# Patient Record
Sex: FEMALE | Race: WHITE | NOT HISPANIC OR LATINO | Employment: PART TIME | URBAN - METROPOLITAN AREA
[De-identification: names, ages, dates, MRNs, and addresses within clinical notes are randomized per-mention and may not be internally consistent; named-entity substitution may affect disease eponyms.]

---

## 2017-01-16 ENCOUNTER — PRENATAL OFFICE VISIT - HEALTHEAST (OUTPATIENT)
Dept: FAMILY MEDICINE | Facility: CLINIC | Age: 37
End: 2017-01-16

## 2017-01-16 DIAGNOSIS — Z34.92 PRENATAL CARE IN SECOND TRIMESTER: ICD-10-CM

## 2017-01-16 DIAGNOSIS — R79.89 ABNORMAL TSH: ICD-10-CM

## 2017-01-16 ASSESSMENT — MIFFLIN-ST. JEOR: SCORE: 1403.44

## 2017-01-17 ENCOUNTER — COMMUNICATION - HEALTHEAST (OUTPATIENT)
Dept: FAMILY MEDICINE | Facility: CLINIC | Age: 37
End: 2017-01-17

## 2017-02-06 ENCOUNTER — COMMUNICATION - HEALTHEAST (OUTPATIENT)
Dept: FAMILY MEDICINE | Facility: CLINIC | Age: 37
End: 2017-02-06

## 2017-02-06 DIAGNOSIS — Z34.90 PREGNANCY: ICD-10-CM

## 2017-02-13 ENCOUNTER — PRENATAL OFFICE VISIT - HEALTHEAST (OUTPATIENT)
Dept: FAMILY MEDICINE | Facility: CLINIC | Age: 37
End: 2017-02-13

## 2017-02-13 ENCOUNTER — COMMUNICATION - HEALTHEAST (OUTPATIENT)
Dept: FAMILY MEDICINE | Facility: CLINIC | Age: 37
End: 2017-02-13

## 2017-02-13 ENCOUNTER — AMBULATORY - HEALTHEAST (OUTPATIENT)
Dept: LAB | Facility: CLINIC | Age: 37
End: 2017-02-13

## 2017-02-13 DIAGNOSIS — Z34.90 PREGNANCY: ICD-10-CM

## 2017-02-13 ASSESSMENT — MIFFLIN-ST. JEOR: SCORE: 1428.39

## 2017-02-21 ENCOUNTER — COMMUNICATION - HEALTHEAST (OUTPATIENT)
Dept: FAMILY MEDICINE | Facility: CLINIC | Age: 37
End: 2017-02-21

## 2017-02-27 ENCOUNTER — PRENATAL OFFICE VISIT - HEALTHEAST (OUTPATIENT)
Dept: FAMILY MEDICINE | Facility: CLINIC | Age: 37
End: 2017-02-27

## 2017-02-27 DIAGNOSIS — Z34.90 PREGNANCY: ICD-10-CM

## 2017-02-27 ASSESSMENT — MIFFLIN-ST. JEOR: SCORE: 1434.06

## 2017-03-13 ENCOUNTER — PRENATAL OFFICE VISIT - HEALTHEAST (OUTPATIENT)
Dept: FAMILY MEDICINE | Facility: CLINIC | Age: 37
End: 2017-03-13

## 2017-03-13 DIAGNOSIS — Z34.90 PREGNANCY: ICD-10-CM

## 2017-03-20 ENCOUNTER — PRENATAL OFFICE VISIT - HEALTHEAST (OUTPATIENT)
Dept: FAMILY MEDICINE | Facility: CLINIC | Age: 37
End: 2017-03-20

## 2017-03-20 DIAGNOSIS — Z34.90 PREGNANCY: ICD-10-CM

## 2017-03-20 ASSESSMENT — MIFFLIN-ST. JEOR: SCORE: 1439.73

## 2017-03-28 ENCOUNTER — PRENATAL OFFICE VISIT - HEALTHEAST (OUTPATIENT)
Dept: FAMILY MEDICINE | Facility: CLINIC | Age: 37
End: 2017-03-28

## 2017-03-28 DIAGNOSIS — Z34.90 PREGNANCY: ICD-10-CM

## 2017-03-28 ASSESSMENT — MIFFLIN-ST. JEOR: SCORE: 1437.46

## 2017-04-07 ENCOUNTER — PRENATAL OFFICE VISIT - HEALTHEAST (OUTPATIENT)
Dept: FAMILY MEDICINE | Facility: CLINIC | Age: 37
End: 2017-04-07

## 2017-04-07 DIAGNOSIS — Z34.90 PREGNANCY: ICD-10-CM

## 2017-04-07 ASSESSMENT — MIFFLIN-ST. JEOR: SCORE: 1445.4

## 2017-04-11 ENCOUNTER — PRENATAL OFFICE VISIT - HEALTHEAST (OUTPATIENT)
Dept: FAMILY MEDICINE | Facility: CLINIC | Age: 37
End: 2017-04-11

## 2017-04-11 DIAGNOSIS — Z34.90 PREGNANCY: ICD-10-CM

## 2017-04-11 ASSESSMENT — MIFFLIN-ST. JEOR: SCORE: 1439.73

## 2017-04-18 ENCOUNTER — PRENATAL OFFICE VISIT - HEALTHEAST (OUTPATIENT)
Dept: FAMILY MEDICINE | Facility: CLINIC | Age: 37
End: 2017-04-18

## 2017-04-18 DIAGNOSIS — Z34.90 PREGNANCY: ICD-10-CM

## 2017-04-18 ASSESSMENT — MIFFLIN-ST. JEOR: SCORE: 1448.8

## 2017-04-25 ENCOUNTER — AMBULATORY - HEALTHEAST (OUTPATIENT)
Dept: FAMILY MEDICINE | Facility: CLINIC | Age: 37
End: 2017-04-25

## 2017-04-25 ENCOUNTER — PRENATAL OFFICE VISIT - HEALTHEAST (OUTPATIENT)
Dept: FAMILY MEDICINE | Facility: CLINIC | Age: 37
End: 2017-04-25

## 2017-04-25 DIAGNOSIS — O48.0 POST-TERM PREGNANCY, 40-42 WEEKS OF GESTATION: ICD-10-CM

## 2017-04-25 DIAGNOSIS — O48.0 POST-DATES PREGNANCY: ICD-10-CM

## 2017-04-25 ASSESSMENT — MIFFLIN-ST. JEOR: SCORE: 1444.26

## 2017-04-26 ENCOUNTER — HOSPITAL ENCOUNTER (OUTPATIENT)
Dept: OBGYN | Facility: HOSPITAL | Age: 37
Discharge: HOME | End: 2017-04-26
Attending: FAMILY MEDICINE | Admitting: FAMILY MEDICINE

## 2017-04-26 ENCOUNTER — HOSPITAL ENCOUNTER (OUTPATIENT)
Dept: ULTRASOUND IMAGING | Facility: HOSPITAL | Age: 37
Discharge: HOME | End: 2017-04-26
Attending: FAMILY MEDICINE

## 2017-04-26 DIAGNOSIS — O48.0 POST-DATES PREGNANCY: ICD-10-CM

## 2017-04-26 ASSESSMENT — MIFFLIN-ST. JEOR: SCORE: 1444.26

## 2017-04-27 ENCOUNTER — SURGERY - HEALTHEAST (OUTPATIENT)
Dept: OBGYN | Facility: HOSPITAL | Age: 37
End: 2017-04-27

## 2017-04-27 ENCOUNTER — ANESTHESIA - HEALTHEAST (OUTPATIENT)
Dept: OBGYN | Facility: HOSPITAL | Age: 37
End: 2017-04-27

## 2017-04-27 ENCOUNTER — COMMUNICATION - HEALTHEAST (OUTPATIENT)
Dept: OBGYN | Facility: HOSPITAL | Age: 37
End: 2017-04-27

## 2017-04-27 ASSESSMENT — MIFFLIN-ST. JEOR: SCORE: 1444.26

## 2017-05-25 ENCOUNTER — COMMUNICATION - HEALTHEAST (OUTPATIENT)
Dept: FAMILY MEDICINE | Facility: CLINIC | Age: 37
End: 2017-05-25

## 2017-06-27 ENCOUNTER — OFFICE VISIT - HEALTHEAST (OUTPATIENT)
Dept: FAMILY MEDICINE | Facility: CLINIC | Age: 37
End: 2017-06-27

## 2017-06-27 ASSESSMENT — MIFFLIN-ST. JEOR: SCORE: 1384.16

## 2017-07-21 ENCOUNTER — COMMUNICATION - HEALTHEAST (OUTPATIENT)
Dept: HEALTH INFORMATION MANAGEMENT | Facility: CLINIC | Age: 37
End: 2017-07-21

## 2017-10-30 ENCOUNTER — AMBULATORY - HEALTHEAST (OUTPATIENT)
Dept: NURSING | Facility: CLINIC | Age: 37
End: 2017-10-30

## 2017-10-30 DIAGNOSIS — Z23 NEED FOR IMMUNIZATION AGAINST INFLUENZA: ICD-10-CM

## 2018-02-02 ENCOUNTER — COMMUNICATION - HEALTHEAST (OUTPATIENT)
Dept: FAMILY MEDICINE | Facility: CLINIC | Age: 38
End: 2018-02-02

## 2018-06-30 ENCOUNTER — COMMUNICATION - HEALTHEAST (OUTPATIENT)
Dept: FAMILY MEDICINE | Facility: CLINIC | Age: 38
End: 2018-06-30

## 2018-12-14 ENCOUNTER — OFFICE VISIT - HEALTHEAST (OUTPATIENT)
Dept: FAMILY MEDICINE | Facility: CLINIC | Age: 38
End: 2018-12-14

## 2018-12-14 ENCOUNTER — COMMUNICATION - HEALTHEAST (OUTPATIENT)
Dept: FAMILY MEDICINE | Facility: CLINIC | Age: 38
End: 2018-12-14

## 2018-12-14 DIAGNOSIS — Z00.00 HEALTHY ADULT ON ROUTINE PHYSICAL EXAMINATION: ICD-10-CM

## 2018-12-14 DIAGNOSIS — Z83.3 FAMILY HISTORY OF DIABETES MELLITUS: ICD-10-CM

## 2018-12-14 LAB
FASTING STATUS PATIENT QL REPORTED: YES
GLUCOSE BLD-MCNC: 79 MG/DL (ref 70–99)

## 2018-12-14 ASSESSMENT — MIFFLIN-ST. JEOR: SCORE: 1389.83

## 2019-09-25 ENCOUNTER — COMMUNICATION - HEALTHEAST (OUTPATIENT)
Dept: FAMILY MEDICINE | Facility: CLINIC | Age: 39
End: 2019-09-25

## 2019-09-25 DIAGNOSIS — J98.01 ACUTE BRONCHOSPASM: ICD-10-CM

## 2019-09-25 RX ORDER — ALBUTEROL SULFATE 90 UG/1
2 AEROSOL, METERED RESPIRATORY (INHALATION) EVERY 4 HOURS PRN
Qty: 1 INHALER | Refills: 1 | Status: SHIPPED | OUTPATIENT
Start: 2019-09-25

## 2021-05-30 VITALS — HEIGHT: 60 IN | BODY MASS INDEX: 36.71 KG/M2 | WEIGHT: 187 LBS

## 2021-05-30 VITALS — WEIGHT: 186.5 LBS | HEIGHT: 60 IN | BODY MASS INDEX: 36.61 KG/M2

## 2021-05-30 VITALS — HEIGHT: 60 IN | WEIGHT: 187 LBS | BODY MASS INDEX: 36.71 KG/M2

## 2021-05-30 VITALS — WEIGHT: 184.5 LBS | HEIGHT: 60 IN | BODY MASS INDEX: 36.22 KG/M2

## 2021-05-30 VITALS — HEIGHT: 60 IN | BODY MASS INDEX: 36.96 KG/M2 | WEIGHT: 188.25 LBS

## 2021-05-30 VITALS — HEIGHT: 60 IN | WEIGHT: 188 LBS | BODY MASS INDEX: 36.91 KG/M2

## 2021-05-30 VITALS — BODY MASS INDEX: 36.91 KG/M2 | WEIGHT: 188 LBS | HEIGHT: 60 IN

## 2021-05-30 VITALS — HEIGHT: 60 IN | BODY MASS INDEX: 36.91 KG/M2 | WEIGHT: 188 LBS

## 2021-05-30 VITALS — WEIGHT: 189 LBS | BODY MASS INDEX: 37.11 KG/M2 | HEIGHT: 60 IN

## 2021-05-30 VITALS — HEIGHT: 60 IN | WEIGHT: 185.75 LBS | BODY MASS INDEX: 36.47 KG/M2

## 2021-05-30 VITALS — BODY MASS INDEX: 37.01 KG/M2 | WEIGHT: 189.5 LBS

## 2021-05-30 VITALS — WEIGHT: 179 LBS | BODY MASS INDEX: 35.14 KG/M2 | HEIGHT: 60 IN

## 2021-05-31 VITALS — HEIGHT: 60 IN | BODY MASS INDEX: 34.31 KG/M2 | WEIGHT: 174.75 LBS

## 2021-06-01 NOTE — TELEPHONE ENCOUNTER
Medication Request  Medication name:  Ventolin   Pharmacy Name and Location:   [facility] - [employer] , [facility]  Reason for request: Patient has cold , its triggering her asthma, requesting refill today. Please send to pharmacy & notify patient.  When did you use medication last?: 5 months ago   Okay to leave a detailed message: yes

## 2021-06-01 NOTE — TELEPHONE ENCOUNTER
CMT reviewed chart. No recent visits. No upcoming appointments. No record of albuterol having been sent-listed as historical. CMT called to collect more information.     The patient caught a cold from her son, this is triggering a dry, asthmatic cough that she gets when she is in cold air. The patient has been kept up at night from this.     Patient had been getting her asthma medications from a provider outside of [facility] years ago. Patient notified that she does not have an updated AAP, patient states that she typically doesn't need asthma medications.

## 2021-06-02 ENCOUNTER — RECORDS - HEALTHEAST (OUTPATIENT)
Dept: ADMINISTRATIVE | Facility: CLINIC | Age: 41
End: 2021-06-02

## 2021-06-02 VITALS — BODY MASS INDEX: 34.55 KG/M2 | HEIGHT: 60 IN | WEIGHT: 176 LBS

## 2021-06-08 NOTE — PROGRESS NOTES
Doing well.  No problems.    Doing her 1 hour gct today.    tdap one today.  Declines syphilis test.

## 2021-06-08 NOTE — PROGRESS NOTES
Doing well.  No problems  Baby is moving well.    She is down to nursing once daily.    1 hour gct, hgb, uai.   tdap at next visit.

## 2021-06-09 NOTE — PROGRESS NOTES
She is doing ok.    She is walking a bit.    Encouraged healthy eating.    Vertex on this US.    US at bedside at next visit.

## 2021-06-09 NOTE — PROGRESS NOTES
Doing well.  No problems.    Breech position today.  Mom feeling movement in a different location this visit.    [facility].    US at bedside at next visit.    gbs at next visit.

## 2021-06-09 NOTE — PROGRESS NOTES
Doing well.  Felt baby turn again.  No lof.  No bleeding.  Keep appt with [facility] in case baby flips again.    Needs US at hospital.

## 2021-06-09 NOTE — PROGRESS NOTES
Doing well.  [facility] went well.    Baby is measuring well.  chris was normal per the patient.    Baby is vertex still.   Will check when she gets to the hospital.  Can attempt version at that time if baby turns around again.    No bleeding ,cramping, or lof.    gbs negative.

## 2021-06-10 NOTE — ANESTHESIA PREPROCEDURE EVALUATION
Anesthesia Evaluation      Patient summary reviewed   No history of anesthetic complications     Airway   Mallampati: II  Neck ROM: full   Pulmonary - normal exam    breath sounds clear to auscultation  (+) asthma                           Cardiovascular - negative ROS and normal exam  Rhythm: regular  Rate: normal,         Neuro/Psych - negative ROS     Endo/Other - negative ROS   (+) obesity,   (-) not pregnant (immediately post partum)     GI/Hepatic/Renal    (-) GERD (denies)     Other findings: Jenkins      Dental - normal exam                        Anesthesia Plan  Planned anesthetic: MAC  Pt with nothing to eat since last evening at dinner    Pt has been and is HD stable.  ASA 2 - emergent     Anesthetic plan and risks discussed with: patient and spouse  Anesthesia plan special considerations: antiemetics,   Post-op plan: routine recovery

## 2021-06-10 NOTE — PROGRESS NOTES
discused induction.    Will plan for cytotec inuction on monday  bpp with nst this weekend  Membranes stripped today.    nst done today.

## 2021-06-10 NOTE — PROGRESS NOTES
Doing well.  No problems.  It feels like the baby dropped down a little bit.    It feels like it is still in a good position.    No swelling.   nst at next visit  Strip membranes at next visit

## 2021-06-10 NOTE — PROGRESS NOTES
Doing well.  No problems.  It does not feel like the lorna troos moved position.    No bleeding, cramping, lof.  No contractions.  She is still walking.  She will be done with work tomorrow.    She is not sure if she is going to go back to work.  She might look for more jobs.    Discussed cytotec for history of retained placenta.

## 2021-06-10 NOTE — ANESTHESIA POSTPROCEDURE EVALUATION
Patient: Raven Aguilar  REMOVAL, RETAINED PLACENTA  Anesthesia type: MAC    Patient location: Labor and Delivery  Last vitals:   Vitals:    04/27/17 1600   BP: 99/54   Pulse: 65   Resp: 16   Temp: 36.8  C (98.2  F)   SpO2:      Post vital signs: stable  Level of consciousness: awake and responds to simple questions  Post-anesthesia pain: pain controlled  Post-anesthesia nausea and vomiting: no  Pulmonary: unassisted, return to baseline  Cardiovascular: stable and blood pressure at baseline  Hydration: adequate  Anesthetic events: no    QCDR Measures:  ASA# 11 - Devorah-op Cardiac Arrest: ASA11B - Patient did NOT experience unanticipated cardiac arrest  ASA# 12 - Devorah-op Mortality Rate: ASA12B - Patient did NOT die  ASA# 13 - PACU Re-Intubation Rate: NA - No ETT / LMA used for case  ASA# 10 - Composite Anes Safety: ASA10A - No serious adverse event  ASA# 38 - New Corneal Injury: ASA38A - No new exposure keratitis or corneal abrasion in PACU    Additional Notes:

## 2021-06-10 NOTE — ANESTHESIA CARE TRANSFER NOTE
Last vitals:   Vitals:    04/27/17 0723   BP: 107/52   Pulse: 81   Resp: 14   Temp: 36.7  C (98.1  F)   SpO2: 100%     Patient's level of consciousness is drowsy  Spontaneous respirations: yes  Maintains airway independently: yes  Dentition unchanged: yes  Oropharynx: oropharynx clear of all foreign objects    QCDR Measures:  ASA# 20 - Surgical Safety Checklist: ASA20A - Safety Checks Done  PQRS# 430 - Adult PONV Prevention: 4558F - Pt received => 2 anti-emetic agents (different classes) preop & intraop  ASA# 8 - Peds PONV Prevention: NA - Not pediatric patient, not GA or 2 or more risk factors NOT present  PQRS# 424 - Devorah-op Temp Management: 4559F - At least one body temp DOCUMENTED => 35.5C or 95.9F within required timeframe  PQRS# 426 - PACU Transfer Protocol: - Transfer of care checklist used  ASA# 14 - Acute Post-op Pain: ASA14B - Patient did NOT experience pain >= 7 out of 10    I completed my SBAR handoff to the receiving nurse per policy and procedure.

## 2021-06-11 NOTE — PROGRESS NOTES
S:  36 yo female who is here for a postpartum visit.  She is going to use condoms for birth control.  She has stopped bleeding.  No sadness or depression.  She has normal bowel and bladder function.  She is breastfeeding.   They are thinking they might want another baby.    Her vaginal delivery was complicated by a retained placenta that did not respond to 800mcg of cytotec given immediately after delivery.  She did up with a manual extraction in the OR.  This is the second time that she has had a retained placenta that required removal of the OR.    She is not going to be going back to work for a little while.  They plan to move into [facility] if possible.  They plan to go look at a house tomorrow.    They have outgrown their home.          O:  /70 (Patient Site: Left Arm, Patient Position: Sitting, Cuff Size: Adult Regular)  Pulse 84  Temp 98.1  F (36.7  C) (Oral)   Resp 16  Ht 5' (1.524 m)  Wt 174 lb 12 oz (79.3 kg)  Breastfeeding? Yes  BMI 34.13 kg/m2  Gen: no acute distress  Neck:  Supple, no lad, no thyromegaly or nodules.    Heart:  Regular rate and rhythm.  No m/r/g  Breast exam is normal  Lungs: cta bilaterally, no wheezes or rhonchi.  Good air inspiration  Abdomen:  No masses or organomegaly  Extremities:  No edema.     Skin:  No rashes  Psych:  Normal.  No depression noted.        Patient Active Problem List   Diagnosis     Lactating mother     Nausea     Vaginal delivery     Retained complete placenta     Current Outpatient Prescriptions on File Prior to Visit   Medication Sig Dispense Refill     cholecalciferol, vitamin D3, (VITAMIN D3) 1,000 unit capsule Take 1,000 Units by mouth daily.       PRENATAL VITS W-CA,FE,FA,<1MG, (PRENATAL VITAMIN ORAL) Take 1 tablet by mouth daily.       albuterol (VENTOLIN HFA) 90 mcg/actuation inhaler Inhale 2 puffs every 4 (four) hours as needed for wheezing.       No current facility-administered medications on file prior to visit.            No results found for this or any previous visit (from the past 48 hour(s)).      Assessment/Plan:  1. Postpartum care and examination  Encouraged regular exercise and healthy diet  Encouraged 10 lb weight loss prior to planning out any further pregnancies  Encouraged to keep breastfeeding.  Encouraged to let me know if she has any problems.  She is utd on all health maintenance.  She should stay on a pnv with iron for the next 6 months given that her last hgb was in the 10's prior to manual extraction of her placenta in the OR.            Araceli Manjarrez   6/27/2017 4:43 PM

## 2021-06-22 NOTE — PROGRESS NOTES
FEMALE PREVENTATIVE EXAM    Assessment and Plan:       1. Family history of diabetes mellitus  Advised diet changes in the entire house.    Advised increased activity as she is able.    - Glucose     2.  Overweight:  Advised diet changes and increased cardiac activity.    Next follow up:  No Follow-up on file.    Immunization Review  Adult Imm Review: She would like to wait and get her flu vaccine with her son        I discussed the following with the patient:   Adult Healthy Living: Importance of regular exercise  Healthy nutrition  Getting adequate sleep  Stress management    I have had an Advance Directives discussion with the patient.  She was given an honoring choices form to fill out today.  She will take this home and discuss it with not only her parents but also her .    Subjective:   Chief Complaint: Raven Aguilar is an 38 y.o. female here for a preventative health visit.     HPI:  37 yo female who is here for a complete physical.  She is doing well.  She is quite busy with her kids.    She is using condoms for birth control.  This is working well for her . Her menses are not heavy.      They are currently doing construction in her home, in her basement.  Their house was built in the 60's.    She walks jennifer to school daily, twice daily.  This is a 5 block walk each way.    She is still nursing.      Healthy Habits  Are you taking a daily aspirin? No  Do you typically exercising at least 40 min, 3-4 times per week?  NO  Do you usually eat at least 4 servings of fruit and vegetables a day, include whole grains and fiber and avoid regularly eating high fat foods? Yes  Have you had an eye exam in the past two years? Yes  Do you see a dentist twice per year? Yes  Do you have any concerns regarding sleep? No    Safety Screen  If you own firearms, are they secured in a locked gun cabinet or with trigger locks? The patient does not own any firearms  Do you feel you are safe where you are living?: Yes  (12/14/2018 10:55 AM)  Do you feel you are safe in your relationship(s)?: Yes (12/14/2018 10:55 AM)      Review of Systems:  Please see above.  The rest of the review of systems are negative for all systems.       Cancer Screening       Status Date      PAP SMEAR Next Due 4/8/2021      Done 4/8/2016 GYNECOLOGIC CYTOLOGY (PAP SMEAR)     Patient has more history with this topic...              History     Reviewed By Date/Time Sections Reviewed    Dorothy Mcdowell LPN 12/14/2018 10:52 AM Tobacco            Objective:   Vital Signs:   Visit Vitals  /70   Pulse 92   Temp 98.4  F (36.9  C) (Oral)   Resp 16   Ht 5' (1.524 m)   Wt 176 lb (79.8 kg)   LMP 12/04/2018   SpO2 99%   BMI 34.37 kg/m           PHYSICAL EXAM  General Appearance: Alert, cooperative, no distress, appears stated age  Head: Normocephalic, without obvious abnormality, atraumatic  Eyes: PERRL, conjunctiva/corneas clear, EOM's intact  Ears: Normal TM's and external ear canals, both ears  Nose: Nares normal, septum midline,mucosa normal, no drainage  Throat: Lips, mucosa, and tongue normal; teeth and gums normal  Neck: Supple, symmetrical, trachea midline, no adenopathy;  thyroid: not enlarged, symmetric, no tenderness/mass/nodules; no carotid bruit or JVD  Back: Symmetric, no curvature, ROM normal, no CVA tenderness  Lungs: Clear to auscultation bilaterally, respirations unlabored  Breasts: No breast masses, tenderness, asymmetry, or nipple discharge.  Heart: Regular rate and rhythm, S1 and S2 normal, no murmur, rub, or gallop,   Abdomen: Soft, non-tender, bowel sounds active all four quadrants,  no masses, no organomegaly, obese  Extremities: Extremities normal, atraumatic, no cyanosis or edema  Skin: Skin color, texture, turgor normal, no rashes or lesions.   noted.   Lymph nodes: Cervical, supraclavicular, and axillary nodes normal  Neurologic: Normal               Additional Screenings Completed Today:

## 2021-07-14 PROBLEM — Z34.90 PREGNANT: Status: RESOLVED | Noted: 2017-04-27 | Resolved: 2017-04-28

## 2021-08-21 ENCOUNTER — HEALTH MAINTENANCE LETTER (OUTPATIENT)
Age: 41
End: 2021-08-21

## 2021-10-16 ENCOUNTER — HEALTH MAINTENANCE LETTER (OUTPATIENT)
Age: 41
End: 2021-10-16

## 2022-09-14 ASSESSMENT — ENCOUNTER SYMPTOMS
PARESTHESIAS: 0
PALPITATIONS: 0
DIARRHEA: 0
SORE THROAT: 0
NAUSEA: 0
MYALGIAS: 0
EYE PAIN: 0
ABDOMINAL PAIN: 0
BREAST MASS: 0
FREQUENCY: 0
COUGH: 0
CHILLS: 0
HEARTBURN: 0
HEADACHES: 0
NERVOUS/ANXIOUS: 0
DIZZINESS: 0
WEAKNESS: 0
FEVER: 0
CONSTIPATION: 0
JOINT SWELLING: 0
HEMATOCHEZIA: 0
ARTHRALGIAS: 0
HEMATURIA: 0
DYSURIA: 0
SHORTNESS OF BREATH: 0

## 2022-09-15 ENCOUNTER — OFFICE VISIT (OUTPATIENT)
Dept: FAMILY MEDICINE | Facility: CLINIC | Age: 42
End: 2022-09-15
Payer: COMMERCIAL

## 2022-09-15 VITALS
DIASTOLIC BLOOD PRESSURE: 80 MMHG | TEMPERATURE: 98.1 F | HEIGHT: 60 IN | RESPIRATION RATE: 16 BRPM | OXYGEN SATURATION: 100 % | HEART RATE: 100 BPM | SYSTOLIC BLOOD PRESSURE: 124 MMHG | WEIGHT: 186 LBS | BODY MASS INDEX: 36.52 KG/M2

## 2022-09-15 DIAGNOSIS — Z11.59 NEED FOR HEPATITIS C SCREENING TEST: ICD-10-CM

## 2022-09-15 DIAGNOSIS — Z13.220 LIPID SCREENING: ICD-10-CM

## 2022-09-15 DIAGNOSIS — Z00.00 ROUTINE GENERAL MEDICAL EXAMINATION AT A HEALTH CARE FACILITY: Primary | ICD-10-CM

## 2022-09-15 DIAGNOSIS — Z12.4 CERVICAL CANCER SCREENING: ICD-10-CM

## 2022-09-15 DIAGNOSIS — Z23 HIGH PRIORITY FOR 2019-NCOV VACCINE: ICD-10-CM

## 2022-09-15 DIAGNOSIS — Z13.1 SCREENING FOR DIABETES MELLITUS: ICD-10-CM

## 2022-09-15 DIAGNOSIS — Z12.31 ENCOUNTER FOR SCREENING MAMMOGRAM FOR BREAST CANCER: ICD-10-CM

## 2022-09-15 LAB — HCV AB SERPL QL IA: NONREACTIVE

## 2022-09-15 PROCEDURE — 86803 HEPATITIS C AB TEST: CPT | Performed by: FAMILY MEDICINE

## 2022-09-15 PROCEDURE — 91312 COVID-19,PF,PFIZER BOOSTER BIVALENT: CPT | Performed by: FAMILY MEDICINE

## 2022-09-15 PROCEDURE — 87624 HPV HI-RISK TYP POOLED RSLT: CPT | Performed by: FAMILY MEDICINE

## 2022-09-15 PROCEDURE — 0124A COVID-19,PF,PFIZER BOOSTER BIVALENT: CPT | Performed by: FAMILY MEDICINE

## 2022-09-15 PROCEDURE — G0145 SCR C/V CYTO,THINLAYER,RESCR: HCPCS | Performed by: FAMILY MEDICINE

## 2022-09-15 PROCEDURE — 80061 LIPID PANEL: CPT | Performed by: FAMILY MEDICINE

## 2022-09-15 PROCEDURE — 82947 ASSAY GLUCOSE BLOOD QUANT: CPT | Performed by: FAMILY MEDICINE

## 2022-09-15 PROCEDURE — 36415 COLL VENOUS BLD VENIPUNCTURE: CPT | Performed by: FAMILY MEDICINE

## 2022-09-15 PROCEDURE — 99386 PREV VISIT NEW AGE 40-64: CPT | Performed by: FAMILY MEDICINE

## 2022-09-15 RX ORDER — MULTIPLE VITAMINS W/ MINERALS TAB 9MG-400MCG
1 TAB ORAL DAILY
COMMUNITY

## 2022-09-15 ASSESSMENT — ENCOUNTER SYMPTOMS
WEAKNESS: 0
ABDOMINAL PAIN: 0
CONSTIPATION: 0
SHORTNESS OF BREATH: 0
HEARTBURN: 0
DYSURIA: 0
FEVER: 0
BREAST MASS: 0
COUGH: 0
NERVOUS/ANXIOUS: 0
JOINT SWELLING: 0
DIARRHEA: 0
EYE PAIN: 0
PALPITATIONS: 0
MYALGIAS: 0
HEMATURIA: 0
NAUSEA: 0
FREQUENCY: 0
HEADACHES: 0
DIZZINESS: 0
PARESTHESIAS: 0
CHILLS: 0
SORE THROAT: 0
HEMATOCHEZIA: 0
ARTHRALGIAS: 0

## 2022-09-15 ASSESSMENT — PAIN SCALES - GENERAL: PAINLEVEL: NO PAIN (0)

## 2022-09-15 NOTE — PROGRESS NOTES
SUBJECTIVE:   CC: Raven is an 42 year old who presents for preventive health visit.     Patient has been advised of split billing requirements and indicates understanding: Yes     Healthy Habits:     Getting at least 3 servings of Calcium per day:  Yes    Bi-annual eye exam:  Yes    Dental care twice a year:  Yes    Sleep apnea or symptoms of sleep apnea:  None    Diet:  Regular (no restrictions)    Frequency of exercise:  2-3 days/week    Duration of exercise:  15-30 minutes    Taking medications regularly:  Yes    Medication side effects:  Not applicable    PHQ-2 Total Score: 0    Additional concerns today:  No        Today's PHQ-2 Score:   PHQ-2 ( 1999 Pf[employer]) 9/14/2022   Q1: Little interest or pleasure in doing things 0   Q2: Feeling down, depressed or hopeless 0   PHQ-2 Score 0   Q1: Little interest or pleasure in doing things Not at all   Q2: Feeling down, depressed or hopeless Not at all   PHQ-2 Score 0       Abuse: Current or Past (Physical, Sexual or Emotional) - No  Do you feel safe in your environment? Yes    Social History     Tobacco Use     Smoking status: Never Smoker     Smokeless tobacco: Never Used   Substance Use Topics     Alcohol use: No     If you drink alcohol do you typically have >3 drinks per day or >7 drinks per week? No    No flowsheet data found.    Reviewed orders with patient.  Reviewed health maintenance and updated orders accordingly - Yes  BP Readings from Last 3 Encounters:   09/15/22 124/80    Wt Readings from Last 3 Encounters:   09/15/22 84.4 kg (186 lb)   12/14/18 79.8 kg (176 lb)   06/27/17 79.3 kg (174 lb 12 oz)                    Breast Cancer Screening:    Breast CA Risk Assessment (FHS-7) 9/14/2022   Do you have a family history of breast, colon, or ovarian cancer? No / Unknown       click delete button to remove this line now  Mammogram Screening - Offered annual screening and updated Health Maintenance for mutual plan based on risk factor consideration    Pertinent  mammograms are reviewed under the imaging tab.    History of abnormal Pap smear: NO - age 30-65 PAP every 5 years with negative HPV co-testing recommended  PAP / HPV 4/8/2016   PAP Negative for squamous intraepithelial lesion or malignancy  Electronically signed by Loreta Gallardo CT (ASCP) on 4/14/2016 at  2:21 PM       Reviewed and updated as needed this visit by clinical staff   Tobacco  Allergies  Meds   Med Hx  Surg Hx  Fam Hx  Soc Hx          Reviewed and updated as needed this visit by Provider                       Review of Systems   Constitutional: Negative for chills and fever.   HENT: Negative for congestion, ear pain, hearing loss and sore throat.    Eyes: Negative for pain and visual disturbance.   Respiratory: Negative for cough and shortness of breath.    Cardiovascular: Negative for chest pain, palpitations and peripheral edema.   Gastrointestinal: Negative for abdominal pain, constipation, diarrhea, heartburn, hematochezia and nausea.   Breasts:  Negative for tenderness, breast mass and discharge.   Genitourinary: Negative for dysuria, frequency, genital sores, hematuria, pelvic pain, urgency, vaginal bleeding and vaginal discharge.   Musculoskeletal: Negative for arthralgias, joint swelling and myalgias.   Skin: Negative for rash.   Neurological: Negative for dizziness, weakness, headaches and paresthesias.   Psychiatric/Behavioral: Negative for mood changes. The patient is not nervous/anxious.           OBJECTIVE:   /80 (BP Location: Right arm, Patient Position: Sitting, Cuff Size: Adult Regular)   Pulse 100   Temp 98.1  F (36.7  C) (Oral)   Resp 16   Ht 1.524 m (5')   Wt 84.4 kg (186 lb)   LMP 08/23/2022   SpO2 100%   BMI 36.33 kg/m    Physical Exam  GENERAL: healthy, alert and no distress  EYES: Eyes grossly normal to inspection, PERRL and conjunctivae and sclerae normal  HENT: ear canals and TM's normal, nose and mouth without ulcers or lesions  NECK: no adenopathy, no  asymmetry, masses, or scars and thyroid normal to palpation  RESP: lungs clear to auscultation - no rales, rhonchi or wheezes  BREAST: normal without masses, tenderness or nipple discharge and no palpable axillary masses or adenopathy  CV: regular rate and rhythm, normal S1 S2, no S3 or S4, no murmur, click or rub, no peripheral edema and peripheral pulses strong  ABDOMEN: soft, nontender, no hepatosplenomegaly, no masses and bowel sounds normal   (female): normal female external genitalia, normal urethral meatus, vaginal mucosa pink, moist, well rugated, and normal cervix/adnexa/uterus without masses or discharge  MS: no gross musculoskeletal defects noted, no edema  SKIN: no suspicious lesions or rashes  NEURO: Normal strength and tone, mentation intact and speech normal  PSYCH: mentation appears normal, affect normal/bright    Diagnostic Test Results:  Labs reviewed in Epic    ASSESSMENT/PLAN:   Raven was seen today for physical.    Diagnoses and all orders for this visit:    Routine general medical examination at a health care facility    Need for hepatitis C screening test  -     Hepatitis C Screen Reflex to HCV RNA Quant and Genotype; Future    Cervical cancer screening  -     Gynecologic Cytology (PAP Smear)    Lipid screening  -     Lipid panel reflex to direct LDL Fasting; Future    Screening for diabetes mellitus  -     Glucose; Future    Encounter for screening mammogram for breast cancer  -     MA Screen Bilateral w/Keny; Future    Other orders  -     REVIEW OF HEALTH MAINTENANCE PROTOCOL ORDERS          COUNSELING:  Reviewed preventive health counseling, as reflected in patient instructions       Regular exercise       Healthy diet/nutrition    Estimated body mass index is 36.33 kg/m  as calculated from the following:    Height as of this encounter: 1.524 m (5').    Weight as of this encounter: 84.4 kg (186 lb).    Weight management plan: Discussed healthy diet and exercise guidelines    She reports  that she has never smoked. She has never used smokeless tobacco.      Counseling Resources:  ATP IV Guidelines  Pooled Cohorts Equation Calculator  Breast Cancer Risk Calculator  BRCA-Related Cancer Risk Assessment: FHS-7 Tool  FRAX Risk Assessment  ICSI Preventive Guidelines  Dietary Guidelines for Americans, 2010  USDA's MyPlate  ASA Prophylaxis  Lung CA Screening    DO YOSEPH Zavaleta [facility]

## 2022-09-16 LAB
CHOLEST SERPL-MCNC: 152 MG/DL
FASTING STATUS PATIENT QL REPORTED: YES
FASTING STATUS PATIENT QL REPORTED: YES
GLUCOSE BLD-MCNC: 91 MG/DL (ref 70–99)
HDLC SERPL-MCNC: 40 MG/DL
LDLC SERPL CALC-MCNC: 98 MG/DL
NONHDLC SERPL-MCNC: 112 MG/DL
TRIGL SERPL-MCNC: 71 MG/DL

## 2022-09-20 LAB
BKR LAB AP GYN ADEQUACY: NORMAL
BKR LAB AP GYN INTERPRETATION: NORMAL
BKR LAB AP HPV REFLEX: NORMAL
BKR LAB AP PREVIOUS ABNORMAL: NORMAL
PATH REPORT.COMMENTS IMP SPEC: NORMAL
PATH REPORT.COMMENTS IMP SPEC: NORMAL
PATH REPORT.RELEVANT HX SPEC: NORMAL

## 2022-09-22 LAB
HUMAN PAPILLOMA VIRUS 16 DNA: NEGATIVE
HUMAN PAPILLOMA VIRUS 18 DNA: NEGATIVE
HUMAN PAPILLOMA VIRUS FINAL DIAGNOSIS: NORMAL
HUMAN PAPILLOMA VIRUS OTHER HR: NEGATIVE

## 2022-10-01 ENCOUNTER — HEALTH MAINTENANCE LETTER (OUTPATIENT)
Age: 42
End: 2022-10-01

## 2022-10-04 ENCOUNTER — ANCILLARY PROCEDURE (OUTPATIENT)
Dept: MAMMOGRAPHY | Facility: CLINIC | Age: 42
End: 2022-10-04
Attending: FAMILY MEDICINE
Payer: COMMERCIAL

## 2022-10-04 DIAGNOSIS — Z12.31 ENCOUNTER FOR SCREENING MAMMOGRAM FOR BREAST CANCER: ICD-10-CM

## 2022-10-04 PROCEDURE — 77067 SCR MAMMO BI INCL CAD: CPT | Mod: TC | Performed by: RADIOLOGY

## 2023-10-15 ENCOUNTER — HEALTH MAINTENANCE LETTER (OUTPATIENT)
Age: 43
End: 2023-10-15

## 2024-12-07 ENCOUNTER — HEALTH MAINTENANCE LETTER (OUTPATIENT)
Age: 44
End: 2024-12-07